# Patient Record
Sex: FEMALE | Race: WHITE
[De-identification: names, ages, dates, MRNs, and addresses within clinical notes are randomized per-mention and may not be internally consistent; named-entity substitution may affect disease eponyms.]

---

## 2018-02-06 ENCOUNTER — HOSPITAL ENCOUNTER (EMERGENCY)
Dept: HOSPITAL 96 - M.ERS | Age: 43
Discharge: HOME | End: 2018-02-06
Payer: COMMERCIAL

## 2018-02-06 VITALS — DIASTOLIC BLOOD PRESSURE: 70 MMHG | SYSTOLIC BLOOD PRESSURE: 110 MMHG

## 2018-02-06 VITALS — HEIGHT: 64 IN | WEIGHT: 220 LBS | BODY MASS INDEX: 37.56 KG/M2

## 2018-02-06 DIAGNOSIS — Z90.710: ICD-10-CM

## 2018-02-06 DIAGNOSIS — Z88.5: ICD-10-CM

## 2018-02-06 DIAGNOSIS — I10: ICD-10-CM

## 2018-02-06 DIAGNOSIS — E78.5: ICD-10-CM

## 2018-02-06 DIAGNOSIS — Z77.22: ICD-10-CM

## 2018-02-06 DIAGNOSIS — Z96.659: ICD-10-CM

## 2018-02-06 DIAGNOSIS — R10.9: Primary | ICD-10-CM

## 2018-02-06 DIAGNOSIS — Z98.890: ICD-10-CM

## 2018-02-06 LAB
ABSOLUTE BASOPHILS: 0.1 THOU/UL (ref 0–0.2)
ABSOLUTE EOSINOPHILS: 0.1 THOU/UL (ref 0–0.7)
ABSOLUTE MONOCYTES: 0.5 THOU/UL (ref 0–1.2)
ALBUMIN SERPL-MCNC: 3.3 G/DL (ref 3.4–5)
ALP SERPL-CCNC: 52 U/L (ref 46–116)
ALT SERPL-CCNC: 19 U/L (ref 30–65)
ANION GAP SERPL CALC-SCNC: 9 MMOL/L (ref 7–16)
AST SERPL-CCNC: 11 U/L (ref 15–37)
BASOPHILS NFR BLD AUTO: 0.9 %
BILIRUB SERPL-MCNC: 0.4 MG/DL
BILIRUB UR-MCNC: NEGATIVE MG/DL
BUN SERPL-MCNC: 18 MG/DL (ref 7–18)
CALCIUM SERPL-MCNC: 8.3 MG/DL (ref 8.5–10.1)
CHLORIDE SERPL-SCNC: 105 MMOL/L (ref 98–107)
CO2 SERPL-SCNC: 25 MMOL/L (ref 21–32)
COLOR UR: YELLOW
CREAT SERPL-MCNC: 0.8 MG/DL (ref 0.6–1.3)
EOSINOPHIL NFR BLD: 0.8 %
GLUCOSE SERPL-MCNC: 91 MG/DL (ref 70–99)
GRANULOCYTES NFR BLD MANUAL: 65.6 %
HCT VFR BLD CALC: 36.6 % (ref 37–47)
HGB BLD-MCNC: 12.6 GM/DL (ref 12–15)
KETONES UR STRIP-MCNC: NEGATIVE MG/DL
LIPASE: 106 U/L (ref 73–393)
LYMPHOCYTES # BLD: 2.2 THOU/UL (ref 0.8–5.3)
LYMPHOCYTES NFR BLD AUTO: 27 %
MCH RBC QN AUTO: 29.2 PG (ref 26–34)
MCHC RBC AUTO-ENTMCNC: 34.4 G/DL (ref 28–37)
MCV RBC: 85.1 FL (ref 80–100)
MONOCYTES NFR BLD: 5.7 %
MPV: 8.5 FL. (ref 7.2–11.1)
NEUTROPHILS # BLD: 5.4 THOU/UL (ref 1.6–8.1)
NUCLEATED RBCS: 0 /100WBC
PLATELET COUNT*: 194 THOU/UL (ref 150–400)
POTASSIUM SERPL-SCNC: 3.4 MMOL/L (ref 3.5–5.1)
PROT SERPL-MCNC: 6.9 G/DL (ref 6.4–8.2)
PROT UR QL STRIP: NEGATIVE
RBC # BLD AUTO: 4.31 MIL/UL (ref 4.2–5)
RBC # UR STRIP: NEGATIVE /UL
RDW-CV: 13.7 % (ref 10.5–14.5)
SODIUM SERPL-SCNC: 139 MMOL/L (ref 136–145)
SP GR UR STRIP: 1.02 (ref 1–1.03)
TROPONIN-I LEVEL: <0.06 NG/ML (ref ?–0.06)
URINE CLARITY: CLEAR
URINE GLUCOSE-RANDOM: NEGATIVE
URINE LEUKOCYTES-REFLEX: NEGATIVE
URINE NITRITE-REFLEX: NEGATIVE
UROBILINOGEN UR STRIP-ACNC: 0.2 E.U./DL (ref 0.2–1)
WBC # BLD AUTO: 8.2 THOU/UL (ref 4–11)

## 2018-02-15 ENCOUNTER — HOSPITAL ENCOUNTER (EMERGENCY)
Dept: HOSPITAL 96 - M.ERS | Age: 43
Discharge: HOME | End: 2018-02-15
Payer: COMMERCIAL

## 2018-02-15 VITALS — BODY MASS INDEX: 37.56 KG/M2 | HEIGHT: 64 IN | WEIGHT: 220 LBS

## 2018-02-15 VITALS — SYSTOLIC BLOOD PRESSURE: 129 MMHG | DIASTOLIC BLOOD PRESSURE: 71 MMHG

## 2018-02-15 DIAGNOSIS — S89.82XA: Primary | ICD-10-CM

## 2018-02-15 DIAGNOSIS — I10: ICD-10-CM

## 2018-02-15 DIAGNOSIS — Z87.442: ICD-10-CM
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
## 2018-02-15 DIAGNOSIS — Z98.890: ICD-10-CM
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
## 2018-02-15 DIAGNOSIS — Z88.5: ICD-10-CM

## 2018-02-15 DIAGNOSIS — E78.5: ICD-10-CM
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
## 2018-02-15 DIAGNOSIS — Z90.49: ICD-10-CM

## 2018-02-15 DIAGNOSIS — Z77.22: ICD-10-CM
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
## 2019-06-22 ENCOUNTER — HOSPITAL ENCOUNTER (EMERGENCY)
Dept: HOSPITAL 96 - M.ERS | Age: 44
Discharge: HOME | End: 2019-06-22
Payer: COMMERCIAL

## 2019-06-22 VITALS — SYSTOLIC BLOOD PRESSURE: 154 MMHG | DIASTOLIC BLOOD PRESSURE: 87 MMHG

## 2019-06-22 VITALS — BODY MASS INDEX: 34.15 KG/M2 | WEIGHT: 200 LBS | HEIGHT: 64 IN

## 2019-06-22 DIAGNOSIS — Z98.890: ICD-10-CM
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
## 2019-06-22 DIAGNOSIS — E78.5: ICD-10-CM
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
## 2019-06-22 DIAGNOSIS — Z77.22: ICD-10-CM

## 2019-06-22 DIAGNOSIS — Z90.710: ICD-10-CM

## 2019-06-22 DIAGNOSIS — Z88.5: ICD-10-CM
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
## 2019-06-22 DIAGNOSIS — I10: ICD-10-CM
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
## 2019-06-22 DIAGNOSIS — S05.01XA: Primary | ICD-10-CM

## 2019-06-22 DIAGNOSIS — Z96.659: ICD-10-CM

## 2019-06-22 DIAGNOSIS — Z87.442: ICD-10-CM

## 2019-06-22 DIAGNOSIS — Z90.49: ICD-10-CM

## 2020-01-04 ENCOUNTER — HOSPITAL ENCOUNTER (EMERGENCY)
Dept: HOSPITAL 96 - M.ERS | Age: 45
Discharge: HOME | End: 2020-01-04
Payer: COMMERCIAL

## 2020-01-04 VITALS — SYSTOLIC BLOOD PRESSURE: 113 MMHG | DIASTOLIC BLOOD PRESSURE: 67 MMHG

## 2020-01-04 VITALS — BODY MASS INDEX: 37.56 KG/M2 | HEIGHT: 64 IN | WEIGHT: 220 LBS

## 2020-01-04 DIAGNOSIS — Z90.710: ICD-10-CM

## 2020-01-04 DIAGNOSIS — Z90.49: ICD-10-CM

## 2020-01-04 DIAGNOSIS — Z88.5: ICD-10-CM

## 2020-01-04 DIAGNOSIS — Z87.442: ICD-10-CM

## 2020-01-04 DIAGNOSIS — H66.91: Primary | ICD-10-CM

## 2020-01-04 DIAGNOSIS — Z96.659: ICD-10-CM

## 2020-01-04 DIAGNOSIS — R42: ICD-10-CM

## 2020-01-04 DIAGNOSIS — E78.5: ICD-10-CM

## 2020-01-04 DIAGNOSIS — Z98.890: ICD-10-CM

## 2020-01-04 DIAGNOSIS — I10: ICD-10-CM

## 2020-01-04 LAB
ABSOLUTE BASOPHILS: 0.1 THOU/UL (ref 0–0.2)
ABSOLUTE EOSINOPHILS: 0.1 THOU/UL (ref 0–0.7)
ABSOLUTE MONOCYTES: 0.5 THOU/UL (ref 0–1.2)
ALBUMIN SERPL-MCNC: 3.6 G/DL (ref 3.4–5)
ALP SERPL-CCNC: 70 U/L (ref 46–116)
ALT SERPL-CCNC: 31 U/L (ref 30–65)
ANION GAP SERPL CALC-SCNC: 8 MMOL/L (ref 7–16)
APTT BLD: 27.5 SECONDS (ref 25–31.3)
AST SERPL-CCNC: 18 U/L (ref 15–37)
BACTERIA-REFLEX: (no result) /HPF
BASOPHILS NFR BLD AUTO: 0.8 %
BILIRUB SERPL-MCNC: 0.3 MG/DL
BILIRUB UR-MCNC: NEGATIVE MG/DL
BUN SERPL-MCNC: 14 MG/DL (ref 7–18)
CALCIUM SERPL-MCNC: 8.4 MG/DL (ref 8.5–10.1)
CHLORIDE SERPL-SCNC: 105 MMOL/L (ref 98–107)
CO2 SERPL-SCNC: 26 MMOL/L (ref 21–32)
COLOR UR: YELLOW
CREAT SERPL-MCNC: 0.9 MG/DL (ref 0.6–1.3)
EOSINOPHIL NFR BLD: 0.8 %
GLUCOSE SERPL-MCNC: 92 MG/DL (ref 70–99)
GRANULOCYTES NFR BLD MANUAL: 68.2 %
HCT VFR BLD CALC: 41.3 % (ref 37–47)
HGB BLD-MCNC: 14.4 GM/DL (ref 12–15)
HYALINE CASTS #/AREA URNS LPF: (no result) /LPF
INR PPP: 1
KETONES UR STRIP-MCNC: NEGATIVE MG/DL
LYMPHOCYTES # BLD: 2.1 THOU/UL (ref 0.8–5.3)
LYMPHOCYTES NFR BLD AUTO: 24.6 %
MCH RBC QN AUTO: 29 PG (ref 26–34)
MCHC RBC AUTO-ENTMCNC: 34.8 G/DL (ref 28–37)
MCV RBC: 83.3 FL (ref 80–100)
MONOCYTES NFR BLD: 5.6 %
MPV: 8.3 FL. (ref 7.2–11.1)
NEUTROPHILS # BLD: 5.8 THOU/UL (ref 1.6–8.1)
NT-PRO BRAIN NAT PEPTIDE: 34 PG/ML (ref ?–300)
NUCLEATED RBCS: 0 /100WBC
PLATELET COUNT*: 249 THOU/UL (ref 150–400)
POTASSIUM SERPL-SCNC: 4.1 MMOL/L (ref 3.5–5.1)
PROT SERPL-MCNC: 7.7 G/DL (ref 6.4–8.2)
PROT UR QL STRIP: NEGATIVE
PROTHROMBIN TIME: 10.2 SECONDS (ref 9.2–11.5)
RBC # BLD AUTO: 4.96 MIL/UL (ref 4.2–5)
RBC # UR STRIP: NEGATIVE /UL
RBC #/AREA URNS HPF: (no result) /HPF (ref 0–2)
RDW-CV: 14.3 % (ref 10.5–14.5)
SODIUM SERPL-SCNC: 139 MMOL/L (ref 136–145)
SP GR UR STRIP: 1.01 (ref 1–1.03)
SQUAMOUS: (no result) /LPF (ref 0–3)
URINE CLARITY: CLEAR
URINE GLUCOSE-RANDOM: NEGATIVE
URINE LEUKOCYTES-REFLEX: (no result)
URINE NITRITE-REFLEX: NEGATIVE
URINE WBC-REFLEX: (no result) /HPF (ref 0–5)
UROBILINOGEN UR STRIP-ACNC: 0.2 E.U./DL (ref 0.2–1)
WBC # BLD AUTO: 8.5 THOU/UL (ref 4–11)

## 2020-01-05 NOTE — EKG
[place] [address]
Phone:  [phone]                     ELECTROCARDIOGRAM REPORT      
_______________________________________________________________________________
 
Name:       ARTURO GARNICA                Room:                      [facility]#:  [id number]      Account #:      [id number]  
Admission:  20     Attend Phys:                         
Discharge:  20     Date of Birth:  [birth year]  
         Report #: [id number]
    [id number]
_______________________________________________________________________________
THIS REPORT FOR:  //name//                      
 
                         [facility] ED
                                       
Test Date:    2020               Test Time:    14:55:56
Pat Name:     ARTURO GARNICA            Department:   
Patient ID:   [id number]            Room:          
Gender:       F                        Technician:   FL
:          1975               Requested By: Bong Cárdenas
Order Number: [id number] MD:   Gilbret Yip
                                 Measurements
Intervals                              Axis          
Rate:         62                       P:            31
TX:           157                      QRS:          24
QRSD:         91                       T:            43
QT:           447                                    
QTc:          454                                    
                           Interpretive Statements
Sinus rhythm
Low voltage, precordial leads
Baseline wander in lead(s) II,III,aVF
Compared to ECG 2015 18:06:28
Low QRS voltage now present
Sinus tachycardia no longer present
 
Electronically Signed On 2020 9:58:32 CST by Gilbert Yip
[email or web address]
 
 
 
 
 
 
 
 
 
 
 
 
 
 
 
 
  <ELECTRONICALLY SIGNED>
                                           By: Gilbert Yip MD, [facility]      
  20     0958
D: 20 1455   _____________________________________
T: 20 1455   Gilbert Yip MD, [facility]        /EPI

## 2021-05-31 ENCOUNTER — HOSPITAL ENCOUNTER (OUTPATIENT)
Dept: HOSPITAL 96 - M.ERS | Age: 46
Setting detail: OBSERVATION
LOS: 1 days | Discharge: HOME | End: 2021-06-01
Attending: INTERNAL MEDICINE | Admitting: INTERNAL MEDICINE
Payer: COMMERCIAL

## 2021-05-31 VITALS — WEIGHT: 240 LBS | HEIGHT: 64.02 IN | BODY MASS INDEX: 40.97 KG/M2

## 2021-05-31 VITALS — DIASTOLIC BLOOD PRESSURE: 71 MMHG | SYSTOLIC BLOOD PRESSURE: 117 MMHG

## 2021-05-31 VITALS — SYSTOLIC BLOOD PRESSURE: 132 MMHG | DIASTOLIC BLOOD PRESSURE: 65 MMHG

## 2021-05-31 VITALS — SYSTOLIC BLOOD PRESSURE: 153 MMHG | DIASTOLIC BLOOD PRESSURE: 87 MMHG

## 2021-05-31 DIAGNOSIS — E83.42: ICD-10-CM

## 2021-05-31 DIAGNOSIS — R07.89: Primary | ICD-10-CM

## 2021-05-31 DIAGNOSIS — Z90.89: ICD-10-CM

## 2021-05-31 DIAGNOSIS — Z20.822: ICD-10-CM

## 2021-05-31 DIAGNOSIS — E66.9: ICD-10-CM

## 2021-05-31 DIAGNOSIS — Z88.6: ICD-10-CM

## 2021-05-31 DIAGNOSIS — M19.90: ICD-10-CM

## 2021-05-31 DIAGNOSIS — J45.909: ICD-10-CM

## 2021-05-31 DIAGNOSIS — I10: ICD-10-CM

## 2021-05-31 DIAGNOSIS — Z98.890: ICD-10-CM

## 2021-05-31 DIAGNOSIS — E78.5: ICD-10-CM

## 2021-05-31 DIAGNOSIS — Z90.49: ICD-10-CM

## 2021-05-31 DIAGNOSIS — Z90.710: ICD-10-CM

## 2021-05-31 LAB
ABSOLUTE BASOPHILS: 0.1 THOU/UL (ref 0–0.2)
ABSOLUTE EOSINOPHILS: 0.2 THOU/UL (ref 0–0.7)
ABSOLUTE MONOCYTES: 0.4 THOU/UL (ref 0–1.2)
ALBUMIN SERPL-MCNC: 3.5 G/DL (ref 3.4–5)
ALP SERPL-CCNC: 73 U/L (ref 46–116)
ALT SERPL-CCNC: 60 U/L (ref 30–65)
ANION GAP SERPL CALC-SCNC: 13 MMOL/L (ref 7–16)
AST SERPL-CCNC: 44 U/L (ref 15–37)
BASOPHILS NFR BLD AUTO: 0.8 %
BILIRUB SERPL-MCNC: 0.1 MG/DL
BUN SERPL-MCNC: 14 MG/DL (ref 7–18)
CALCIUM SERPL-MCNC: 9 MG/DL (ref 8.5–10.1)
CHLORIDE SERPL-SCNC: 104 MMOL/L (ref 98–107)
CO2 SERPL-SCNC: 25 MMOL/L (ref 21–32)
CREAT SERPL-MCNC: 0.9 MG/DL (ref 0.6–1.3)
EOSINOPHIL NFR BLD: 1.8 %
GLUCOSE SERPL-MCNC: 165 MG/DL (ref 70–99)
GRANULOCYTES NFR BLD MANUAL: 61.2 %
HCT VFR BLD CALC: 42.1 % (ref 37–47)
HGB BLD-MCNC: 14.6 GM/DL (ref 12–15)
LIPASE: 108 U/L (ref 73–393)
LYMPHOCYTES # BLD: 3.2 THOU/UL (ref 0.8–5.3)
LYMPHOCYTES NFR BLD AUTO: 32.1 %
MAGNESIUM SERPL-MCNC: 1.7 MG/DL (ref 1.8–2.4)
MCH RBC QN AUTO: 28.7 PG (ref 26–34)
MCHC RBC AUTO-ENTMCNC: 34.8 G/DL (ref 28–37)
MCV RBC: 82.6 FL (ref 80–100)
MONOCYTES NFR BLD: 4.1 %
MPV: 8.9 FL. (ref 7.2–11.1)
NEUTROPHILS # BLD: 6.1 THOU/UL (ref 1.6–8.1)
NT-PRO BRAIN NAT PEPTIDE: 6 PG/ML (ref ?–300)
NUCLEATED RBCS: 0 /100WBC
PLATELET COUNT*: 235 THOU/UL (ref 150–400)
POTASSIUM SERPL-SCNC: 3.5 MMOL/L (ref 3.5–5.1)
PROT SERPL-MCNC: 7.7 G/DL (ref 6.4–8.2)
RBC # BLD AUTO: 5.1 MIL/UL (ref 4.2–5)
RDW-CV: 14.5 % (ref 10.5–14.5)
SODIUM SERPL-SCNC: 142 MMOL/L (ref 136–145)
WBC # BLD AUTO: 10 THOU/UL (ref 4–11)

## 2021-06-01 VITALS — DIASTOLIC BLOOD PRESSURE: 73 MMHG | SYSTOLIC BLOOD PRESSURE: 127 MMHG

## 2021-06-01 VITALS — SYSTOLIC BLOOD PRESSURE: 142 MMHG | DIASTOLIC BLOOD PRESSURE: 87 MMHG

## 2021-06-01 VITALS — DIASTOLIC BLOOD PRESSURE: 69 MMHG | SYSTOLIC BLOOD PRESSURE: 104 MMHG

## 2021-06-01 VITALS — DIASTOLIC BLOOD PRESSURE: 64 MMHG | SYSTOLIC BLOOD PRESSURE: 114 MMHG

## 2021-06-01 NOTE — EKG
[place] [address]
Phone:  [phone]                     ELECTROCARDIOGRAM REPORT      
_______________________________________________________________________________
 
Name:         ARTURO GARNICA               Room:          [address]#:    [id number]     Account #:     [id number]  
Admission:    21    Attend Phys:   Vineet Yip
Discharge:                Date of Birth: [birth year]  
Date of Service: 21 1628  Report #:      [id number]
        [id number]
_______________________________________________________________________________
THIS REPORT FOR:  //name//                      
 
                         [facility] ED
                                       
Test Date:    2021               Test Time:    16:28:50
Pat Name:     ARTURO GARNICA            Department:   
Patient ID:   [id number]            Room:         [facility]
Gender:       F                        Technician:   AL
:          1975               Requested By: Yanick Marie
Order Number: [id number] MD:   Jhonny Lozoya
                                 Measurements
Intervals                              Axis          
Rate:         107                      P:            42
CO:           169                      QRS:          20
QRSD:         86                       T:            58
QT:           332                                    
QTc:          443                                    
                           Interpretive Statements
Sinus tachycardia
Minimal ST elevation, inferior leads
Compared to ECG 2020 14:55:56
ST (T wave) deviation now present
Sinus rhythm no longer present
Electronically Signed On 2021 12:10:24 CDT by Jhonny Lozoya
[email or web address]
 
 
 
 
 
 
 
 
 
 
 
 
 
 
 
 
 
 
 
  <ELECTRONICALLY SIGNED>
                                           By: Jhonny Lozoya MD, FACC   
  21     1210
D: 21 1628   _____________________________________
T: 21 1628   Jhonny Lozoya MD, FAC     /EPI

## 2021-06-02 NOTE — EXE
[place] [address]
Phone:  [phone]                     STRESS ECHOCARDIOGRAM         
_______________________________________________________________________________
 
Name:         ARTURO GARNICA               Room:          [address] [place] YODER#:    [id number]     Account #:     [id number]  
Admission:    05/31/21    Attend Phys:   Vineet Yip
Discharge:    06/01/21    Date of Birth: [birth year]  
Date of Service: 06/01/21 1549  Report #:      [id number]
        [id number]
_______________________________________________________________________________
THIS REPORT FOR:
 
cc:  Jorge Delgado Robin L. FNP Liston, Michael J. MD [facility]     
                                                                       ~
 
--------------- APPROVED REPORT --------------
 
 
Study performed:  06/01/2021 11:30:13
 
Exam:  Stress Echocardiogram
Indication: Chest pain , Dyspnea
Patient Location: In-Patient
Stress Nurse: Katy Farris RN
Room #: [facility]
Supervising Physician: Jhonny Lozoya MD
 
Ht: 5 ft 4 in      
HR: 98 bpm       BP: 119/91 mmHg 
 
Medical History
Cardiac Risk Factors: Hyperlipidemia, HTN, FHX of CAD
 
Procedure
The patient underwent an Exercise Stress Test using the Mario 
Protocol. Blood pressure, heart rate, and EKG were monitored.
An Echocardiogram was performed by technician in four stages in quad 
fashion.  At peak stress, four selected images were obtained and 
placed side by side with resting images for comparison.
 
Stress Test Details
Stress Test:  Exercise stress testing was performed using a Mario 
protocol.      
HR           
Resting HR:            98 bpm   Max Heart Rate (APMHR): 174 bpm  
Max HR Achieved:  162 bpm   Target HR (85% APMHR): 147 bpm  
% of APMHR:         93         
Recovery HR:            114 bpm        
HR response to stress: Normal HR response to stress      
 
BP           
Resting BP:  119/91 mmHg        
Max BP:       150/72 mmHg        
Recovery BP:       123/86 mmHg        
 
 
[place] [address]
Phone:  [phone]                     STRESS ECHOCARDIOGRAM         
_______________________________________________________________________________
 
Name:         ARTURO GARNICA HUSSAIN               Room:          [address]#:    [id number]     Account #:     [id number]  
Admission:    05/31/21    Attend Phys:   Vineet Yip
Discharge:    06/01/21    Date of Birth: [birth year]  
Date of Service: 06/01/21 1549  Report #:      [id number]
        [id number]
_______________________________________________________________________________
BP response to stress: Normal blood pressure response to 
stress.      
ECG           
Resting ECG:  Sinus Rhythm        
Stress ECG:     Sinus Tachycardia       
ST Change: None          
Arrhythmia:    None         
Recovery ECG: Sinus Rhythm        
Recovery ST Change: None        
Recovery Arrhythmia: None        
 
Clinical
Reason for Termination: Completed protocol
Exercise duration: 7 min sec
Highest Stage Achieved: Stage 3: 3.4 mph at 14% grade. 
Exercise capacity: 8.44 METs
The patient tolerated standard Mario protocol exercise without 
significant cardiac symptoms.
 
Stress ECG Conclusion
Baseline twelve-lead EKG shows sinus rhythm without significant ST 
segment or T wave abnormality.  EKGs obtained during and post 
exercise show sinus rhythm and sinus tachycardia with no significant 
ST segment or T wave changes when compared to baseline.  There were 
no stress-induced arrhythmias.
 
Pre-Stress Echo
The resting Echocardiogram showed normal left ventricular 
contractility with an estimated Ejection Fraction of about 60-65%. 
The resting echocardiogram demonstrated normal wall motion in all 
wall segments.  
 
Post-Stress Echo
The stress Echocardiogram showed normal left ventricular 
contractility with an estimated Ejection Fraction of about >70%. 
Compared to rest, there were no stress-induced wall motion 
abnormalities. 
 
Conclusion
Clinical Response:  Non-ischemic
Exercise Capacity:  Average
Stress ECG Response:  Non-ischemic
Stress Echo Images:  Non-ischemic
 
 
 
[place] [address]
Phone:  [phone]                     STRESS ECHOCARDIOGRAM         
_______________________________________________________________________________
 
Name:         ARTURO GARNICA               Room:          [address]#:    [id number]     Account #:     [id number]  
Admission:    05/31/21    Attend Phys:   Vineet Yip
Discharge:    06/01/21    Date of Birth: [birth year]  
Date of Service: 06/01/21 1549  Report #:      [id number]
        [id number]
_______________________________________________________________________________
Other Information
Study Quality: Good
 
 
 
 
 
 
 
 
 
 
 
 
 
 
 
 
 
 
 
 
 
 
 
 
 
 
 
 
 
 
 
 
 
 
 
 
 
 
 
 
 
 
  <ELECTRONICALLY SIGNED>
                                           By: Jhonny Lozoya MD, FACC   
  06/01/21     1549
D: 06/01/21 1549   _____________________________________
T: 06/01/21 1549   Jhonny Lozoya MD, FACC     /INF

## 2021-06-13 ENCOUNTER — HOSPITAL ENCOUNTER (EMERGENCY)
Dept: HOSPITAL 96 - M.ERS | Age: 46
Discharge: HOME | End: 2021-06-13
Payer: COMMERCIAL

## 2021-06-13 VITALS — BODY MASS INDEX: 40.97 KG/M2 | WEIGHT: 240 LBS | HEIGHT: 64 IN

## 2021-06-13 VITALS — DIASTOLIC BLOOD PRESSURE: 79 MMHG | SYSTOLIC BLOOD PRESSURE: 126 MMHG

## 2021-06-13 DIAGNOSIS — K57.32: Primary | ICD-10-CM

## 2021-06-13 DIAGNOSIS — Z98.890: ICD-10-CM

## 2021-06-13 DIAGNOSIS — Z87.442: ICD-10-CM

## 2021-06-13 DIAGNOSIS — Z90.710: ICD-10-CM

## 2021-06-13 DIAGNOSIS — Z77.22: ICD-10-CM

## 2021-06-13 DIAGNOSIS — Z90.49: ICD-10-CM

## 2021-06-13 DIAGNOSIS — K21.9: ICD-10-CM

## 2021-06-13 DIAGNOSIS — E78.5: ICD-10-CM

## 2021-06-13 DIAGNOSIS — R11.2: ICD-10-CM

## 2021-06-13 DIAGNOSIS — I10: ICD-10-CM

## 2021-06-13 DIAGNOSIS — Z96.659: ICD-10-CM

## 2021-06-13 LAB
ABSOLUTE BASOPHILS: 0.1 THOU/UL (ref 0–0.2)
ABSOLUTE EOSINOPHILS: 0 THOU/UL (ref 0–0.7)
ABSOLUTE MONOCYTES: 1 THOU/UL (ref 0–1.2)
ALBUMIN SERPL-MCNC: 3.5 G/DL (ref 3.4–5)
ALP SERPL-CCNC: 89 U/L (ref 46–116)
ALT SERPL-CCNC: 51 U/L (ref 30–65)
ANION GAP SERPL CALC-SCNC: 11 MMOL/L (ref 7–16)
AST SERPL-CCNC: 57 U/L (ref 15–37)
BASOPHILS NFR BLD AUTO: 0.7 %
BILIRUB SERPL-MCNC: 0.5 MG/DL
BILIRUB UR-MCNC: NEGATIVE MG/DL
BUN SERPL-MCNC: 15 MG/DL (ref 7–18)
CALCIUM SERPL-MCNC: 8.9 MG/DL (ref 8.5–10.1)
CHLORIDE SERPL-SCNC: 97 MMOL/L (ref 98–107)
CO2 SERPL-SCNC: 25 MMOL/L (ref 21–32)
COLOR UR: YELLOW
CREAT SERPL-MCNC: 1 MG/DL (ref 0.6–1.3)
EOSINOPHIL NFR BLD: 0.4 %
GLUCOSE SERPL-MCNC: 152 MG/DL (ref 70–99)
GRANULOCYTES NFR BLD MANUAL: 72.8 %
HCT VFR BLD CALC: 45.6 % (ref 37–47)
HGB BLD-MCNC: 15.9 GM/DL (ref 12–15)
KETONES UR STRIP-MCNC: NEGATIVE MG/DL
LIPASE: 91 U/L (ref 73–393)
LYMPHOCYTES # BLD: 1.8 THOU/UL (ref 0.8–5.3)
LYMPHOCYTES NFR BLD AUTO: 17.1 %
MCH RBC QN AUTO: 29.2 PG (ref 26–34)
MCHC RBC AUTO-ENTMCNC: 34.9 G/DL (ref 28–37)
MCV RBC: 83.7 FL (ref 80–100)
MONOCYTES NFR BLD: 9 %
MPV: 8.2 FL. (ref 7.2–11.1)
NEUTROPHILS # BLD: 7.7 THOU/UL (ref 1.6–8.1)
NUCLEATED RBCS: 0 /100WBC
PLATELET COUNT*: 230 THOU/UL (ref 150–400)
POTASSIUM SERPL-SCNC: 3.4 MMOL/L (ref 3.5–5.1)
PROT SERPL-MCNC: 8.8 G/DL (ref 6.4–8.2)
PROT UR QL STRIP: NEGATIVE
RBC # BLD AUTO: 5.45 MIL/UL (ref 4.2–5)
RBC # UR STRIP: NEGATIVE /UL
RDW-CV: 14.5 % (ref 10.5–14.5)
SODIUM SERPL-SCNC: 133 MMOL/L (ref 136–145)
SP GR UR STRIP: 1.01 (ref 1–1.03)
SQUAMOUS: (no result) /LPF (ref 0–3)
URINE CLARITY: (no result)
URINE GLUCOSE-RANDOM: NEGATIVE
URINE LEUKOCYTES-REFLEX: (no result)
URINE NITRITE-REFLEX: NEGATIVE
URINE WBC-REFLEX: (no result) /HPF (ref 0–5)
UROBILINOGEN UR STRIP-ACNC: 0.2 E.U./DL (ref 0.2–1)
WBC # BLD AUTO: 10.6 THOU/UL (ref 4–11)

## 2021-06-14 NOTE — EKG
[place] [address]
Phone:  [phone]                     ELECTROCARDIOGRAM REPORT      
_______________________________________________________________________________
 
Name:         VINCENTERIKARTURO HUSSAIN               Room:                     [facility]#:    [id number]     Account #:     [id number]  
Admission:    21    Attend Phys:                     
Discharge:    21    Date of Birth: [birth year]  
Date of Service: 21  Report #:      [id number]
        [id number]
_______________________________________________________________________________
THIS REPORT FOR:  //name//                      
 
                         [facility] ED
                                       
Test Date:    2021               Test Time:    21:20:29
Pat Name:     ARTURO GARNICA            Department:   
Patient ID:   [id number]            Room:          
Gender:                               Technician:   FL
:          1975               Requested By: Eliud Veronica
Order Number: [id number]    Pedro MD:   Gilbert Yip
                                 Measurements
Intervals                              Axis          
Rate:         126                      P:            26
TN:           146                      QRS:          10
QRSD:         91                       T:            45
QT:           316                                    
QTc:          458                                    
                           Interpretive Statements
Sinus tachycardia
Compared to ECG 2021 16:28:50
rate has increased
Electronically Signed On 2021 11:38:13 CDT by Gilbert Yip
[email or web address]
 
 
 
 
 
 
 
 
 
 
 
 
 
 
 
 
 
 
 
 
 
  <ELECTRONICALLY SIGNED>
                                           By: Gilbert Yip MD, [facility]      
  21     1138
D: 21   _____________________________________
T: 21   Gilbert Yip MD, [facility]        /EPI

## 2021-08-09 ENCOUNTER — HOSPITAL ENCOUNTER (EMERGENCY)
Dept: HOSPITAL 96 - M.ERS | Age: 46
Discharge: HOME | End: 2021-08-09
Payer: COMMERCIAL

## 2021-08-09 VITALS — BODY MASS INDEX: 40.97 KG/M2 | WEIGHT: 240 LBS | HEIGHT: 64 IN

## 2021-08-09 VITALS — SYSTOLIC BLOOD PRESSURE: 107 MMHG | DIASTOLIC BLOOD PRESSURE: 58 MMHG

## 2021-08-09 DIAGNOSIS — E78.5: ICD-10-CM

## 2021-08-09 DIAGNOSIS — I10: ICD-10-CM

## 2021-08-09 DIAGNOSIS — Z77.22: ICD-10-CM

## 2021-08-09 DIAGNOSIS — K21.9: ICD-10-CM

## 2021-08-09 DIAGNOSIS — Z87.442: ICD-10-CM

## 2021-08-09 DIAGNOSIS — Z98.890: ICD-10-CM

## 2021-08-09 DIAGNOSIS — Z90.49: ICD-10-CM

## 2021-08-09 DIAGNOSIS — Z90.710: ICD-10-CM

## 2021-08-09 DIAGNOSIS — R10.32: ICD-10-CM

## 2021-08-09 DIAGNOSIS — Z88.5: ICD-10-CM

## 2021-08-09 DIAGNOSIS — R10.12: Primary | ICD-10-CM

## 2021-08-09 DIAGNOSIS — R11.2: ICD-10-CM

## 2021-08-09 LAB
ABSOLUTE BASOPHILS: 0.1 THOU/UL (ref 0–0.2)
ABSOLUTE EOSINOPHILS: 0.1 THOU/UL (ref 0–0.7)
ABSOLUTE MONOCYTES: 0.5 THOU/UL (ref 0–1.2)
ALBUMIN SERPL-MCNC: 3.7 G/DL (ref 3.4–5)
ALP SERPL-CCNC: 63 U/L (ref 46–116)
ALT SERPL-CCNC: 76 U/L (ref 30–65)
ANION GAP SERPL CALC-SCNC: 9 MMOL/L (ref 7–16)
AST SERPL-CCNC: 67 U/L (ref 15–37)
BASOPHILS NFR BLD AUTO: 1.3 %
BILIRUB SERPL-MCNC: 0.4 MG/DL
BILIRUB UR-MCNC: NEGATIVE MG/DL
BUN SERPL-MCNC: 12 MG/DL (ref 7–18)
CALCIUM SERPL-MCNC: 8.9 MG/DL (ref 8.5–10.1)
CHLORIDE SERPL-SCNC: 102 MMOL/L (ref 98–107)
CO2 SERPL-SCNC: 28 MMOL/L (ref 21–32)
COLOR UR: YELLOW
CREAT SERPL-MCNC: 0.8 MG/DL (ref 0.6–1.3)
EOSINOPHIL NFR BLD: 1.3 %
GLUCOSE SERPL-MCNC: 121 MG/DL (ref 70–99)
GRANULOCYTES NFR BLD MANUAL: 52.7 %
HCT VFR BLD CALC: 43.2 % (ref 37–47)
HGB BLD-MCNC: 14.6 GM/DL (ref 12–15)
KETONES UR STRIP-MCNC: NEGATIVE MG/DL
LIPASE: 106 U/L (ref 73–393)
LYMPHOCYTES # BLD: 3 THOU/UL (ref 0.8–5.3)
LYMPHOCYTES NFR BLD AUTO: 38.3 %
MCH RBC QN AUTO: 28.4 PG (ref 26–34)
MCHC RBC AUTO-ENTMCNC: 33.7 G/DL (ref 28–37)
MCV RBC: 84.1 FL (ref 80–100)
MONOCYTES NFR BLD: 6.4 %
MPV: 8.3 FL. (ref 7.2–11.1)
NEUTROPHILS # BLD: 4.1 THOU/UL (ref 1.6–8.1)
NUCLEATED RBCS: 0 /100WBC
PLATELET COUNT*: 221 THOU/UL (ref 150–400)
POTASSIUM SERPL-SCNC: 3.5 MMOL/L (ref 3.5–5.1)
PROT SERPL-MCNC: 7.8 G/DL (ref 6.4–8.2)
PROT UR QL STRIP: NEGATIVE
RBC # BLD AUTO: 5.14 MIL/UL (ref 4.2–5)
RBC # UR STRIP: NEGATIVE /UL
RDW-CV: 13.9 % (ref 10.5–14.5)
SODIUM SERPL-SCNC: 139 MMOL/L (ref 136–145)
SP GR UR STRIP: 1.02 (ref 1–1.03)
URINE CLARITY: CLEAR
URINE GLUCOSE-RANDOM: NEGATIVE
URINE LEUKOCYTES-REFLEX: NEGATIVE
URINE NITRITE-REFLEX: NEGATIVE
UROBILINOGEN UR STRIP-ACNC: 0.2 E.U./DL (ref 0.2–1)
WBC # BLD AUTO: 7.7 THOU/UL (ref 4–11)